# Patient Record
Sex: MALE | Race: WHITE | Employment: FULL TIME | URBAN - METROPOLITAN AREA
[De-identification: names, ages, dates, MRNs, and addresses within clinical notes are randomized per-mention and may not be internally consistent; named-entity substitution may affect disease eponyms.]

---

## 2024-04-02 ENCOUNTER — HOSPITAL ENCOUNTER (OUTPATIENT)
Dept: INTERVENTIONAL RADIOLOGY/VASCULAR | Age: 64
Discharge: HOME | End: 2024-04-04
Payer: COMMERCIAL

## 2024-04-02 VITALS
HEART RATE: 60 BPM | HEIGHT: 66 IN | SYSTOLIC BLOOD PRESSURE: 152 MMHG | RESPIRATION RATE: 20 BRPM | TEMPERATURE: 97.2 F | DIASTOLIC BLOOD PRESSURE: 77 MMHG | BODY MASS INDEX: 21.05 KG/M2 | WEIGHT: 131 LBS | OXYGEN SATURATION: 99 %

## 2024-04-02 DIAGNOSIS — K55.1 SUPERIOR MESENTERIC ARTERY STENOSIS (HCC): ICD-10-CM

## 2024-04-02 LAB
BUN SERPL-MCNC: 16 MG/DL (ref 8–23)
CREAT SERPL-MCNC: 0.9 MG/DL (ref 0.7–1.2)
ERYTHROCYTE [DISTWIDTH] IN BLOOD BY AUTOMATED COUNT: 12.6 % (ref 11.8–14.4)
GFR SERPL CREATININE-BSD FRML MDRD: >90 ML/MIN/1.73M2
HCT VFR BLD AUTO: 43.2 % (ref 40.7–50.3)
HGB BLD-MCNC: 14.2 G/DL (ref 13–17)
INR PPP: 0.9
MCH RBC QN AUTO: 32 PG (ref 25.2–33.5)
MCHC RBC AUTO-ENTMCNC: 32.9 G/DL (ref 28.4–34.8)
MCV RBC AUTO: 97.3 FL (ref 82.6–102.9)
NRBC BLD-RTO: 0 PER 100 WBC
PARTIAL THROMBOPLASTIN TIME: 28.8 SEC (ref 23.9–33.8)
PLATELET # BLD AUTO: 205 K/UL (ref 138–453)
PMV BLD AUTO: 11 FL (ref 8.1–13.5)
PROTHROMBIN TIME: 12 SEC (ref 11.5–14.2)
RBC # BLD AUTO: 4.44 M/UL (ref 4.21–5.77)
WBC OTHER # BLD: 6.8 K/UL (ref 3.5–11.3)

## 2024-04-02 PROCEDURE — 36245 INS CATH ABD/L-EXT ART 1ST: CPT

## 2024-04-02 PROCEDURE — C1725 CATH, TRANSLUMIN NON-LASER: HCPCS

## 2024-04-02 PROCEDURE — 6360000002 HC RX W HCPCS: Performed by: SURGERY

## 2024-04-02 PROCEDURE — 6370000000 HC RX 637 (ALT 250 FOR IP): Performed by: SURGERY

## 2024-04-02 PROCEDURE — 85610 PROTHROMBIN TIME: CPT

## 2024-04-02 PROCEDURE — 82565 ASSAY OF CREATININE: CPT

## 2024-04-02 PROCEDURE — 7100000010 HC PHASE II RECOVERY - FIRST 15 MIN

## 2024-04-02 PROCEDURE — 2580000003 HC RX 258: Performed by: SURGERY

## 2024-04-02 PROCEDURE — 84520 ASSAY OF UREA NITROGEN: CPT

## 2024-04-02 PROCEDURE — 85730 THROMBOPLASTIN TIME PARTIAL: CPT

## 2024-04-02 PROCEDURE — 75726 ARTERY X-RAYS ABDOMEN: CPT

## 2024-04-02 PROCEDURE — 85027 COMPLETE CBC AUTOMATED: CPT

## 2024-04-02 PROCEDURE — 6360000004 HC RX CONTRAST MEDICATION: Performed by: SURGERY

## 2024-04-02 PROCEDURE — 7100000011 HC PHASE II RECOVERY - ADDTL 15 MIN

## 2024-04-02 PROCEDURE — 99153 MOD SED SAME PHYS/QHP EA: CPT

## 2024-04-02 PROCEDURE — 99152 MOD SED SAME PHYS/QHP 5/>YRS: CPT

## 2024-04-02 PROCEDURE — 37236 OPEN/PERQ PLACE STENT 1ST: CPT

## 2024-04-02 RX ORDER — ONDANSETRON 2 MG/ML
4 INJECTION INTRAMUSCULAR; INTRAVENOUS EVERY 8 HOURS PRN
Status: DISCONTINUED | OUTPATIENT
Start: 2024-04-02 | End: 2024-04-05 | Stop reason: HOSPADM

## 2024-04-02 RX ORDER — MIDAZOLAM HYDROCHLORIDE 5 MG/ML
INJECTION INTRAMUSCULAR; INTRAVENOUS PRN
Status: COMPLETED | OUTPATIENT
Start: 2024-04-02 | End: 2024-04-02

## 2024-04-02 RX ORDER — FENTANYL CITRATE 50 UG/ML
25 INJECTION, SOLUTION INTRAMUSCULAR; INTRAVENOUS ONCE
Status: COMPLETED | OUTPATIENT
Start: 2024-04-02 | End: 2024-04-02

## 2024-04-02 RX ORDER — MORPHINE SULFATE 2 MG/ML
2 INJECTION, SOLUTION INTRAMUSCULAR; INTRAVENOUS
Status: DISCONTINUED | OUTPATIENT
Start: 2024-04-02 | End: 2024-04-05 | Stop reason: HOSPADM

## 2024-04-02 RX ORDER — PROTAMINE SULFATE 10 MG/ML
INJECTION, SOLUTION INTRAVENOUS PRN
Status: COMPLETED | OUTPATIENT
Start: 2024-04-02 | End: 2024-04-02

## 2024-04-02 RX ORDER — MORPHINE SULFATE 4 MG/ML
4 INJECTION, SOLUTION INTRAMUSCULAR; INTRAVENOUS
Status: DISCONTINUED | OUTPATIENT
Start: 2024-04-02 | End: 2024-04-05 | Stop reason: HOSPADM

## 2024-04-02 RX ORDER — DEXTROSE AND SODIUM CHLORIDE 5; .45 G/100ML; G/100ML
INJECTION, SOLUTION INTRAVENOUS CONTINUOUS
Status: DISCONTINUED | OUTPATIENT
Start: 2024-04-02 | End: 2024-04-05 | Stop reason: HOSPADM

## 2024-04-02 RX ORDER — HYDROCODONE BITARTRATE AND ACETAMINOPHEN 5; 325 MG/1; MG/1
2 TABLET ORAL EVERY 4 HOURS PRN
Status: DISCONTINUED | OUTPATIENT
Start: 2024-04-02 | End: 2024-04-05 | Stop reason: HOSPADM

## 2024-04-02 RX ORDER — OMEPRAZOLE 40 MG/1
1 CAPSULE, DELAYED RELEASE ORAL DAILY
COMMUNITY
Start: 2024-02-29

## 2024-04-02 RX ORDER — HYDROCODONE BITARTRATE AND ACETAMINOPHEN 5; 325 MG/1; MG/1
1 TABLET ORAL EVERY 4 HOURS PRN
Status: DISCONTINUED | OUTPATIENT
Start: 2024-04-02 | End: 2024-04-05 | Stop reason: HOSPADM

## 2024-04-02 RX ORDER — IODIXANOL 320 MG/ML
INJECTION, SOLUTION INTRAVASCULAR PRN
Status: COMPLETED | OUTPATIENT
Start: 2024-04-02 | End: 2024-04-02

## 2024-04-02 RX ORDER — CLOPIDOGREL BISULFATE 75 MG/1
75 TABLET ORAL DAILY
COMMUNITY

## 2024-04-02 RX ORDER — HEPARIN SODIUM 5000 [USP'U]/ML
INJECTION, SOLUTION INTRAVENOUS; SUBCUTANEOUS PRN
Status: COMPLETED | OUTPATIENT
Start: 2024-04-02 | End: 2024-04-02

## 2024-04-02 RX ORDER — CLOPIDOGREL BISULFATE 75 MG/1
150 TABLET ORAL ONCE
Status: COMPLETED | OUTPATIENT
Start: 2024-04-02 | End: 2024-04-02

## 2024-04-02 RX ORDER — FENTANYL CITRATE 50 UG/ML
INJECTION, SOLUTION INTRAMUSCULAR; INTRAVENOUS PRN
Status: COMPLETED | OUTPATIENT
Start: 2024-04-02 | End: 2024-04-02

## 2024-04-02 RX ORDER — ACETAMINOPHEN 325 MG/1
650 TABLET ORAL EVERY 4 HOURS PRN
Status: DISCONTINUED | OUTPATIENT
Start: 2024-04-02 | End: 2024-04-05 | Stop reason: HOSPADM

## 2024-04-02 RX ORDER — SODIUM CHLORIDE 450 MG/100ML
INJECTION, SOLUTION INTRAVENOUS CONTINUOUS
Status: DISCONTINUED | OUTPATIENT
Start: 2024-04-02 | End: 2024-04-05 | Stop reason: HOSPADM

## 2024-04-02 RX ADMIN — PROTAMINE SULFATE 10 MG: 10 INJECTION, SOLUTION INTRAVENOUS at 10:50

## 2024-04-02 RX ADMIN — MIDAZOLAM HYDROCHLORIDE 0.5 MG: 5 INJECTION INTRAMUSCULAR; INTRAVENOUS at 10:03

## 2024-04-02 RX ADMIN — FENTANYL CITRATE 25 MCG: 50 INJECTION INTRAMUSCULAR; INTRAVENOUS at 13:19

## 2024-04-02 RX ADMIN — FENTANYL CITRATE 25 MCG: 50 INJECTION INTRAMUSCULAR; INTRAVENOUS at 10:02

## 2024-04-02 RX ADMIN — DEXTROSE AND SODIUM CHLORIDE: 5; 450 INJECTION, SOLUTION INTRAVENOUS at 08:10

## 2024-04-02 RX ADMIN — HEPARIN SODIUM 5000 UNITS: 5000 INJECTION, SOLUTION INTRAVENOUS; SUBCUTANEOUS at 10:27

## 2024-04-02 RX ADMIN — IODIXANOL 100 ML: 320 INJECTION, SOLUTION INTRAVASCULAR at 10:30

## 2024-04-02 RX ADMIN — CLOPIDOGREL BISULFATE 150 MG: 75 TABLET ORAL at 14:14

## 2024-04-02 ASSESSMENT — PAIN SCALES - GENERAL
PAINLEVEL_OUTOF10: 5
PAINLEVEL_OUTOF10: 2

## 2024-04-02 ASSESSMENT — PAIN - FUNCTIONAL ASSESSMENT
PAIN_FUNCTIONAL_ASSESSMENT: 0-10
PAIN_FUNCTIONAL_ASSESSMENT: NONE - DENIES PAIN

## 2024-04-02 ASSESSMENT — PAIN DESCRIPTION - LOCATION: LOCATION: ABDOMEN;GROIN

## 2024-04-02 ASSESSMENT — PAIN DESCRIPTION - DESCRIPTORS: DESCRIPTORS: PRESSURE;SORE

## 2024-04-02 NOTE — H&P
History and Physical Service   [facility]    HISTORY AND PHYSICAL EXAMINATION            Date of Evaluation: 4/2/2024  Patient name:  Oswaldo Mckeon  MRN:   [id number]  YOB: 1960  PCP:    No primary care provider on file.    History Obtained From:     Patient, medical records    History of Present Illness:     This is Oswaldo Mckeon a 63 y.o. male who presents today for a IR Angiogram Superior mesenteric angiogram for Superior mesenteric artery stenosis (HCC). States was having abdominal symptoms of pain pressure and aching for several days, he was then ordered a ct scan where the stenosis was diagnosed.  Denies fever, chills, shortness of breath, cough, congestion, wheezing, chest pain, open sores or wounds small itchy rash to abdominal and lower back region. Last ate and drank yesterday 7pm. Denies diabetes history. Last took plavix 6 days ago.     Past Medical History:     Past Medical History:   Diagnosis Date    Anxiety     Irritable bowel     Mesenteric artery stenosis (HCC)         Past Surgical History:     Past Surgical History:   Procedure Laterality Date    ANKLE FRACTURE SURGERY Right     2 pins in    COLONOSCOPY      ENDOSCOPY, COLON, DIAGNOSTIC          Medications Prior to Admission:     Prior to Admission medications    Medication Sig Start Date End Date Taking? Authorizing Provider   clopidogrel (PLAVIX) 75 MG tablet Take 1 tablet by mouth daily   Yes ProviderBob MD   sertraline (ZOLOFT) 50 MG tablet Take 1 tablet by mouth daily   Yes Bob Ramirez MD   omeprazole (PRILOSEC) 40 MG delayed release capsule Take 1 capsule by mouth daily 2/29/24  Yes Bob Ramirez MD        Allergies:     Penicillins and Morphine    Social History:     Tobacco:    reports that he has been smoking cigarettes. He started smoking about 44 years ago. He has a 22.1 pack-year smoking history. He has never used smokeless tobacco.  Alcohol:      reports current

## 2024-04-02 NOTE — DISCHARGE INSTRUCTIONS
Watch for signs and symptoms of infection including fever, chills, headache, vomiting.  Watch for increased redness, swelling or drainage at the site.  If persistent bleeding occurs, hold pressure and call the doctor.  Watch for any increased weakness or numbness in legs.  May remove band-aid and shower tomorrow.  Soap and water only.  No soaking or submerging under water for 1 week.  May resume normal diet and activity.  No driving, heavy lifting or strenuous activity for 3 days.  Call doctor for any complications from procedure.

## 2024-04-02 NOTE — BRIEF OP NOTE
Brief Postoperative Note      Patient: Oswaldo Mckeon  YOB: 1960  MRN: [id number]    Date of Procedure: 4/2/2024    Pre-Op diagnosis: SMA stenosis    Post-Op Diagnosis: Same       Procedure: Abdominal aortogram  Selective SMA angiography  SMA angioplasty with 6 mm x 20 mm Kareem balloon  SMA stenting with 7 mm x 19 mm express Monorail stent oversized to 7.8 mm  Ultrasound-guided access right common femoral artery  Placement of right femoral Mynx closure device  Conscious sedation    Surgeon: Be Veras MD    Assistant:  * No surgical staff found *    Anesthesia: Local/IV sedation    Estimated Blood Loss (mL): Minimal    Complications: None    Specimens:   * No specimens in log *    Implants:  * No implants in log *      Drains:   Biliary Stent (Active)       Findings: 75% proximal SMA stenosis with resolution post angioplasty and stenting.      Electronically signed by Be Veras MD on 4/2/2024 at 10:57 AM

## 2024-04-02 NOTE — OP NOTE
[place] [address]                            OPERATIVE REPORT      PATIENT NAME: QUEENIE HOWARD             : 1960  MED REC NO: [id number]                         ROOM:   ACCOUNT NO: [id number]                       ADMIT DATE: 2024  PROVIDER: Be Veras MD      DATE OF PROCEDURE:  2024    SURGEON:  Be Veras MD    PREOPERATIVE DIAGNOSIS:  Severe 75% proximal superior mesenteric artery stenosis.    POSTOPERATIVE DIAGNOSIS:  Severe 75% proximal superior mesenteric artery stenosis.    PROCEDURES:    1. Abdominal aortogram.  2. Selective superior mesenteric artery angiography.  3. Superior mesenteric artery angioplasty with 6 mm x 20 mm Kareem balloon.  4. Superior mesenteric artery stenting with 7 mm x 19 mm Express Monorail stent oversized to 7.8 mm.  5. Ultrasound-guided access, right common femoral artery.  6. Placement of right femoral Mynx closure device.  7. Conscious sedation.    ANESTHESIA:  Local IV sedation.    ESTIMATED BLOOD LOSS:  Minimal.    COMPLICATIONS:  None.    INDICATIONS:  The patient is a 63-year-old male, who presents with postprandial pain and weight loss.  He was found to have severe stenosis of the origin of the superior mesenteric artery, which was estimated at approximately 75%.  At this time, he is being taken to the angiography suite for further evaluation and therapy.    DESCRIPTION OF PROCEDURE:  After informed consent had been obtained, patient was brought to the angiography suite, placed in supine position and both groins were prepped and draped in usual sterile fashion.  Conscious sedation was initiated by trained personnel using appropriate monitoring equipment.  The patient received 25 mcg of fentanyl and 0.5 mg of Versed over 1 hour.  The right groin was anesthetized with 1% lidocaine.  Under ultrasound imaging, right common femoral artery was isolated and a

## 2024-04-02 NOTE — PRE SEDATION
Sedation Plan  ASA: class 2 - patient with mild systemic disease     Mallampati class: II - soft palate, uvula, fauces visible.    Sedation plan: moderate (conscious sedation)    Risks, benefits, and alternatives discussed with patient.        Immediate reassessment prior to sedation:  Patient's status reviewed and vital signs assessed; acceptable to perform procedure and proceed to administer sedation as planned.

## 2024-04-02 NOTE — POST SEDATION
Sedation Post Procedure Note    Patient Name: Oswaldo Mckeon   YOB: 1960  Room/Bed: Room/bed info not found  Medical Record Number: [id number]  Date: 4/2/2024   Time: 10:59 AM         Physicians/Assistants: Be Veras MD, MD    Procedure Performed:  Abdominal aortogram  Selective SMA angiography  SMA angioplasty with 6 mm x 20 mm Kareem balloon  SMA stenting with 7 mm x 19 mm express Monorail stent oversized to 7.8 mm  Ultrasound-guided access right common femoral artery  Placement of right femoral Mynx closure device  Conscious sedation    Post-Sedation Vital Signs:  Vitals:    04/02/24 1058   BP:    Pulse:    Resp:    Temp:    SpO2: 100%      Vital signs were reviewed and were stable after the procedure (see flow sheet for vitals)            Post-Sedation Exam: Lungs: clear to auscultation bilaterally and Cardiovascular: regular rate and rhythm           Complications: none    Electronically signed by Be Veras MD on 4/2/2024 at 10:59 AM